# Patient Record
Sex: FEMALE | Race: BLACK OR AFRICAN AMERICAN | URBAN - METROPOLITAN AREA
[De-identification: names, ages, dates, MRNs, and addresses within clinical notes are randomized per-mention and may not be internally consistent; named-entity substitution may affect disease eponyms.]

---

## 2020-01-30 PROBLEM — 428283002 HISTORY OF POLYP OF COLON (SITUATION): Status: ACTIVE | Noted: 2020-01-30

## 2020-01-30 PROBLEM — 70153002 HEMORRHOIDS: Status: ACTIVE | Noted: 2020-01-30

## 2020-01-30 PROBLEM — 266435005 GASTRO-ESOPHAGEAL REFLUX DISEASE WITHOUT ESOPHAGITIS: Status: ACTIVE | Noted: 2020-01-30

## 2020-01-30 PROBLEM — 429006005 FAMILY HISTORY OF MALIGNANT NEOPLASM OF GASTROINTESTINAL TRACT: Status: ACTIVE | Noted: 2020-01-30

## 2020-06-03 ENCOUNTER — OFFICE VISIT (OUTPATIENT)
Dept: URBAN - METROPOLITAN AREA CLINIC 29 | Facility: CLINIC | Age: 60
End: 2020-06-03

## 2020-06-17 ENCOUNTER — OFFICE VISIT (OUTPATIENT)
Dept: URBAN - METROPOLITAN AREA CLINIC 29 | Facility: CLINIC | Age: 60
End: 2020-06-17

## 2020-07-22 ENCOUNTER — OFFICE VISIT (OUTPATIENT)
Dept: URBAN - METROPOLITAN AREA CLINIC 29 | Facility: CLINIC | Age: 60
End: 2020-07-22

## 2020-08-12 ENCOUNTER — OFFICE VISIT (OUTPATIENT)
Dept: URBAN - METROPOLITAN AREA CLINIC 29 | Facility: CLINIC | Age: 60
End: 2020-08-12

## 2020-08-24 ENCOUNTER — OFFICE VISIT (OUTPATIENT)
Dept: URBAN - METROPOLITAN AREA CLINIC 29 | Facility: CLINIC | Age: 60
End: 2020-08-24

## 2020-09-21 ENCOUNTER — OFFICE VISIT (OUTPATIENT)
Dept: URBAN - METROPOLITAN AREA CLINIC 29 | Facility: CLINIC | Age: 60
End: 2020-09-21

## 2020-10-28 ENCOUNTER — OFFICE VISIT (OUTPATIENT)
Dept: URBAN - METROPOLITAN AREA CLINIC 29 | Facility: CLINIC | Age: 60
End: 2020-10-28

## 2020-12-04 ENCOUNTER — OFFICE VISIT (OUTPATIENT)
Dept: URBAN - METROPOLITAN AREA CLINIC 29 | Facility: CLINIC | Age: 60
End: 2020-12-04

## 2021-04-19 ENCOUNTER — OFFICE VISIT (OUTPATIENT)
Dept: URBAN - METROPOLITAN AREA CLINIC 29 | Facility: CLINIC | Age: 61
End: 2021-04-19

## 2021-04-19 PROBLEM — 414916001 OBESITY: Status: ACTIVE | Noted: 2021-04-19

## 2022-02-14 ENCOUNTER — OFFICE VISIT (OUTPATIENT)
Dept: URBAN - METROPOLITAN AREA CLINIC 29 | Facility: CLINIC | Age: 62
End: 2022-02-14

## 2022-02-14 PROBLEM — 271834000 ERUCTATION: Status: ACTIVE | Noted: 2022-02-14

## 2022-02-14 PROBLEM — 422587007 NAUSEA: Status: ACTIVE | Noted: 2022-02-14

## 2022-02-14 PROBLEM — 3696007 FUNCTIONAL DYSPEPSIA: Status: ACTIVE | Noted: 2022-02-14

## 2022-03-08 ENCOUNTER — OFFICE VISIT (OUTPATIENT)
Dept: URBAN - METROPOLITAN AREA SURGERY CENTER 7 | Facility: SURGERY CENTER | Age: 62
End: 2022-03-08

## 2022-04-30 ENCOUNTER — TELEPHONE ENCOUNTER (OUTPATIENT)
Dept: URBAN - METROPOLITAN AREA CLINIC 121 | Facility: CLINIC | Age: 62
End: 2022-04-30

## 2022-04-30 RX ORDER — OMEPRAZOLE MAGNESIUM, AMOXICILLIN AND RIFABUTIN 10; 250; 12.5 MG/1; MG/1; MG/1
TAKE 4 CAPSULE BY MOUTH EVERY EIGHT HOURS WITH MEALS CAPSULE, DELAYED RELEASE ORAL
OUTPATIENT
Start: 2022-02-18 | End: 2022-03-04

## 2022-04-30 RX ORDER — SEMAGLUTIDE 1.34 MG/ML
TAKE 0.25 SC QWEEK X 4 WEEKS INJECTION, SOLUTION SUBCUTANEOUS
OUTPATIENT
Start: 2020-06-03 | End: 2020-07-01

## 2022-05-01 ENCOUNTER — TELEPHONE ENCOUNTER (OUTPATIENT)
Dept: URBAN - METROPOLITAN AREA CLINIC 121 | Facility: CLINIC | Age: 62
End: 2022-05-01

## 2022-05-01 RX ORDER — GABAPENTIN 400 MG/1
QD CAPSULE ORAL
Status: ACTIVE | COMMUNITY
Start: 2020-01-30

## 2022-05-01 RX ORDER — OMEPRAZOLE 40 MG/1
QD CAPSULE, DELAYED RELEASE ORAL
Status: ACTIVE | COMMUNITY
Start: 2020-01-30

## 2022-05-01 RX ORDER — LISINOPRIL AND HYDROCHLOROTHIAZIDE TABLETS 10; 12.5 MG/1; MG/1
QD TABLET ORAL
Status: ACTIVE | COMMUNITY
Start: 2020-01-30

## 2022-05-01 RX ORDER — SEMAGLUTIDE 1.34 MG/ML
INJECT 0.5MG UNDER THE SKIN SC QWEEKS INJECTION, SOLUTION SUBCUTANEOUS
Status: ACTIVE | COMMUNITY
Start: 2020-08-24

## 2022-05-01 RX ORDER — SODIUM SULFATE, POTASSIUM SULFATE, MAGNESIUM SULFATE 17.5; 3.13; 1.6 G/ML; G/ML; G/ML
MIX AND USE AS DIRECTED SOLUTION, CONCENTRATE ORAL
Status: ACTIVE | COMMUNITY
Start: 2020-01-30

## 2022-05-01 RX ORDER — LINACLOTIDE 145 UG/1
TAKE 1 CAPSULE PO QD CAPSULE, GELATIN COATED ORAL
Status: ACTIVE | COMMUNITY
Start: 2020-06-03

## 2022-05-01 RX ORDER — DULOXETINE 60 MG/1
QD CAPSULE, DELAYED RELEASE PELLETS ORAL
Status: ACTIVE | COMMUNITY
Start: 2020-01-30

## 2022-05-01 RX ORDER — CYCLOBENZAPRINE HYDROCHLORIDE 10 MG/1
QD TABLET, FILM COATED ORAL
Status: ACTIVE | COMMUNITY
Start: 2020-01-30

## 2022-05-01 RX ORDER — SEMAGLUTIDE 1.34 MG/ML
INJECT 0.25MG UNDER THE SKIN ONCE A WEEK INJECTION, SOLUTION SUBCUTANEOUS
Status: ACTIVE | COMMUNITY
Start: 2020-06-03

## 2022-05-01 RX ORDER — ESTRADIOL 1 MG/1
QD TABLET ORAL
Status: ACTIVE | COMMUNITY
Start: 2020-01-30

## 2022-05-01 RX ORDER — HYDROCODONE BITARTRATE AND ACETAMINOPHEN 10; 300 MG/15ML; MG/15ML
SYRUP ORAL
Status: ACTIVE | COMMUNITY
Start: 2020-01-30

## 2022-05-01 RX ORDER — LEVOTHYROXINE SODIUM 50 UG/1
QD TABLET ORAL
Status: ACTIVE | COMMUNITY
Start: 2020-01-30

## 2022-05-24 ENCOUNTER — TELEPHONE ENCOUNTER (OUTPATIENT)
Dept: URBAN - METROPOLITAN AREA CLINIC 29 | Facility: CLINIC | Age: 62
End: 2022-05-24

## 2022-06-15 ENCOUNTER — TELEPHONE ENCOUNTER (OUTPATIENT)
Dept: URBAN - METROPOLITAN AREA CLINIC 27 | Facility: CLINIC | Age: 62
End: 2022-06-15

## 2022-06-15 RX ORDER — SEMAGLUTIDE 1.34 MG/ML
INJECT 0.5MG INJECTION, SOLUTION SUBCUTANEOUS
Qty: 1 | Refills: 4
Start: 2020-08-24 | End: 2022-11-11

## 2022-07-18 ENCOUNTER — OFFICE VISIT (OUTPATIENT)
Dept: URBAN - METROPOLITAN AREA CLINIC 29 | Facility: CLINIC | Age: 62
End: 2022-07-18
Payer: COMMERCIAL

## 2022-07-18 VITALS
TEMPERATURE: 97.1 F | BODY MASS INDEX: 31.71 KG/M2 | SYSTOLIC BLOOD PRESSURE: 119 MMHG | HEART RATE: 82 BPM | HEIGHT: 63 IN | DIASTOLIC BLOOD PRESSURE: 78 MMHG | WEIGHT: 179 LBS

## 2022-07-18 DIAGNOSIS — K59.04 CHRONIC IDIOPATHIC CONSTIPATION: ICD-10-CM

## 2022-07-18 PROCEDURE — 99213 OFFICE O/P EST LOW 20 MIN: CPT | Performed by: INTERNAL MEDICINE

## 2022-07-18 PROCEDURE — 99204 OFFICE O/P NEW MOD 45 MIN: CPT | Performed by: INTERNAL MEDICINE

## 2022-07-18 RX ORDER — LISINOPRIL AND HYDROCHLOROTHIAZIDE TABLETS 10; 12.5 MG/1; MG/1
QD TABLET ORAL
Status: ACTIVE | COMMUNITY
Start: 2020-01-30

## 2022-07-18 RX ORDER — HYDROCODONE BITARTRATE AND ACETAMINOPHEN 10; 300 MG/15ML; MG/15ML
SYRUP ORAL
Status: ACTIVE | COMMUNITY
Start: 2020-01-30

## 2022-07-18 RX ORDER — OMEPRAZOLE 40 MG/1
QD CAPSULE, DELAYED RELEASE ORAL
Status: ACTIVE | COMMUNITY
Start: 2020-01-30

## 2022-07-18 RX ORDER — TENAPANOR HYDROCHLORIDE 53.2 MG/1
1 TABLET IMMEDIATELY BEFORE MEALS TABLET ORAL TWICE A DAY
Qty: 60 | OUTPATIENT
Start: 2022-07-18 | End: 2022-08-17

## 2022-07-18 RX ORDER — SODIUM SULFATE, POTASSIUM SULFATE, MAGNESIUM SULFATE 17.5; 3.13; 1.6 G/ML; G/ML; G/ML
MIX AND USE AS DIRECTED SOLUTION, CONCENTRATE ORAL
Status: ACTIVE | COMMUNITY
Start: 2020-01-30

## 2022-07-18 RX ORDER — LINACLOTIDE 145 UG/1
TAKE 1 CAPSULE PO QD CAPSULE, GELATIN COATED ORAL
Status: ACTIVE | COMMUNITY
Start: 2020-06-03

## 2022-07-18 RX ORDER — LEVOTHYROXINE SODIUM 50 UG/1
QD TABLET ORAL
Status: ACTIVE | COMMUNITY
Start: 2020-01-30

## 2022-07-18 RX ORDER — SEMAGLUTIDE 1.34 MG/ML
INJECT 0.5MG INJECTION, SOLUTION SUBCUTANEOUS
Qty: 1 | Refills: 4 | Status: ACTIVE | COMMUNITY
Start: 2020-08-24 | End: 2022-11-11

## 2022-07-18 RX ORDER — GABAPENTIN 400 MG/1
QD CAPSULE ORAL
Status: ACTIVE | COMMUNITY
Start: 2020-01-30

## 2022-07-18 RX ORDER — ESTRADIOL 1 MG/1
QD TABLET ORAL
Status: ACTIVE | COMMUNITY
Start: 2020-01-30

## 2022-07-18 RX ORDER — CYCLOBENZAPRINE HYDROCHLORIDE 10 MG/1
QD TABLET, FILM COATED ORAL
Status: ACTIVE | COMMUNITY
Start: 2020-01-30

## 2022-07-18 RX ORDER — DULOXETINE 60 MG/1
QD CAPSULE, DELAYED RELEASE PELLETS ORAL
Status: ACTIVE | COMMUNITY
Start: 2020-01-30

## 2022-07-18 NOTE — HPI-TODAY'S VISIT:
Ms. Govea is  62 year  old woman complaining of epigastric burning.   She  is on omeprazole daily.   She had an EMR performed for a tubular adenoma in her duodenum in 5/22.   She states the heart burn is better.  She also suffers from constipation.  She has a bowel movement every other day with straining.

## 2022-08-19 ENCOUNTER — OFFICE VISIT (OUTPATIENT)
Dept: URBAN - METROPOLITAN AREA CLINIC 29 | Facility: CLINIC | Age: 62
End: 2022-08-19

## 2022-10-31 ENCOUNTER — TELEPHONE ENCOUNTER (OUTPATIENT)
Dept: URBAN - METROPOLITAN AREA CLINIC 29 | Facility: CLINIC | Age: 62
End: 2022-10-31

## 2022-10-31 RX ORDER — TENAPANOR HYDROCHLORIDE 53.2 MG/1
1 TABLET IMMEDIATELY BEFORE MEALS TABLET ORAL TWICE A DAY
Qty: 60 | Refills: 1
Start: 2022-07-18 | End: 2022-12-30

## 2022-11-09 ENCOUNTER — OFFICE VISIT (OUTPATIENT)
Dept: URBAN - METROPOLITAN AREA CLINIC 29 | Facility: CLINIC | Age: 62
End: 2022-11-09
Payer: COMMERCIAL

## 2022-11-09 VITALS
DIASTOLIC BLOOD PRESSURE: 80 MMHG | BODY MASS INDEX: 31.18 KG/M2 | TEMPERATURE: 96.9 F | HEART RATE: 89 BPM | WEIGHT: 176 LBS | SYSTOLIC BLOOD PRESSURE: 131 MMHG | HEIGHT: 63 IN

## 2022-11-09 DIAGNOSIS — D13.2 DUODENAL ADENOMA: ICD-10-CM

## 2022-11-09 DIAGNOSIS — K59.04 CHRONIC IDIOPATHIC CONSTIPATION: ICD-10-CM

## 2022-11-09 PROCEDURE — 99213 OFFICE O/P EST LOW 20 MIN: CPT | Performed by: INTERNAL MEDICINE

## 2022-11-09 RX ORDER — OMEPRAZOLE 40 MG/1
QD CAPSULE, DELAYED RELEASE ORAL
Status: ACTIVE | COMMUNITY
Start: 2020-01-30

## 2022-11-09 RX ORDER — LINACLOTIDE 145 UG/1
TAKE 1 CAPSULE PO QD CAPSULE, GELATIN COATED ORAL
Status: ACTIVE | COMMUNITY
Start: 2020-06-03

## 2022-11-09 RX ORDER — SODIUM SULFATE, POTASSIUM SULFATE, MAGNESIUM SULFATE 17.5; 3.13; 1.6 G/ML; G/ML; G/ML
MIX AND USE AS DIRECTED SOLUTION, CONCENTRATE ORAL
Status: ACTIVE | COMMUNITY
Start: 2020-01-30

## 2022-11-09 RX ORDER — LEVOTHYROXINE SODIUM 50 UG/1
QD TABLET ORAL
Status: ACTIVE | COMMUNITY
Start: 2020-01-30

## 2022-11-09 RX ORDER — GABAPENTIN 400 MG/1
QD CAPSULE ORAL
Status: ACTIVE | COMMUNITY
Start: 2020-01-30

## 2022-11-09 RX ORDER — TENAPANOR HYDROCHLORIDE 53.2 MG/1
1 TABLET IMMEDIATELY BEFORE MEALS TABLET ORAL TWICE A DAY
Qty: 60 | Refills: 1
Start: 2022-07-18 | End: 2023-01-08

## 2022-11-09 RX ORDER — ESTRADIOL 1 MG/1
QD TABLET ORAL
Status: ACTIVE | COMMUNITY
Start: 2020-01-30

## 2022-11-09 RX ORDER — DULOXETINE 60 MG/1
QD CAPSULE, DELAYED RELEASE PELLETS ORAL
Status: ACTIVE | COMMUNITY
Start: 2020-01-30

## 2022-11-09 RX ORDER — LISINOPRIL AND HYDROCHLOROTHIAZIDE TABLETS 10; 12.5 MG/1; MG/1
QD TABLET ORAL
Status: ACTIVE | COMMUNITY
Start: 2020-01-30

## 2022-11-09 RX ORDER — HYDROCODONE BITARTRATE AND ACETAMINOPHEN 10; 300 MG/15ML; MG/15ML
SYRUP ORAL
Status: ACTIVE | COMMUNITY
Start: 2020-01-30

## 2022-11-09 RX ORDER — SEMAGLUTIDE 1.34 MG/ML
INJECT 0.5MG INJECTION, SOLUTION SUBCUTANEOUS
Qty: 1 | Refills: 4 | Status: ACTIVE | COMMUNITY
Start: 2020-08-24 | End: 2022-11-11

## 2022-11-09 RX ORDER — TENAPANOR HYDROCHLORIDE 53.2 MG/1
1 TABLET IMMEDIATELY BEFORE MEALS TABLET ORAL TWICE A DAY
Qty: 60 | Refills: 1 | Status: ACTIVE | COMMUNITY
Start: 2022-07-18 | End: 2022-12-30

## 2022-11-09 RX ORDER — CYCLOBENZAPRINE HYDROCHLORIDE 10 MG/1
QD TABLET, FILM COATED ORAL
Status: ACTIVE | COMMUNITY
Start: 2020-01-30

## 2022-11-09 NOTE — HPI-TODAY'S VISIT:
Ms. Govea is a 62-year old who present for follow p.  She had  tubular adenoma resected from her duodenum 5/2022 and was told she needs  a follow up EGD.  SHe has no  foregut symptoms.  Currently.  She states the Ibsrela is working, but she can only take  half  a dose, and she has a watery stool in the AM.

## 2022-11-14 ENCOUNTER — CLAIMS CREATED FROM THE CLAIM WINDOW (OUTPATIENT)
Dept: URBAN - METROPOLITAN AREA SURGERY CENTER 7 | Facility: SURGERY CENTER | Age: 62
End: 2022-11-14
Payer: COMMERCIAL

## 2022-11-14 ENCOUNTER — CLAIMS CREATED FROM THE CLAIM WINDOW (OUTPATIENT)
Dept: URBAN - METROPOLITAN AREA SURGERY CENTER 7 | Facility: SURGERY CENTER | Age: 62
End: 2022-11-14

## 2022-11-14 ENCOUNTER — CLAIMS CREATED FROM THE CLAIM WINDOW (OUTPATIENT)
Dept: URBAN - METROPOLITAN AREA CLINIC 4 | Facility: CLINIC | Age: 62
End: 2022-11-14
Payer: COMMERCIAL

## 2022-11-14 DIAGNOSIS — K31.7 POLYP OF STOMACH AND DUODENUM: ICD-10-CM

## 2022-11-14 DIAGNOSIS — K31.89 ACQUIRED DEFORMITY OF DUODENUM: ICD-10-CM

## 2022-11-14 DIAGNOSIS — D13.2 ADENOMA OF DUODENUM: ICD-10-CM

## 2022-11-14 DIAGNOSIS — K31.7 BENIGN GASTRIC POLYP: ICD-10-CM

## 2022-11-14 DIAGNOSIS — K29.70 GASTRITIS, UNSPECIFIED, WITHOUT BLEEDING: ICD-10-CM

## 2022-11-14 DIAGNOSIS — K31.89 OTHER DISEASES OF STOMACH AND DUODENUM: ICD-10-CM

## 2022-11-14 PROCEDURE — 88305 TISSUE EXAM BY PATHOLOGIST: CPT | Performed by: PATHOLOGY

## 2022-11-14 PROCEDURE — G8907 PT DOC NO EVENTS ON DISCHARG: HCPCS | Performed by: INTERNAL MEDICINE

## 2022-11-14 PROCEDURE — 88312 SPECIAL STAINS GROUP 1: CPT | Performed by: PATHOLOGY

## 2022-11-14 PROCEDURE — G8907 PT DOC NO EVENTS ON DISCHARG: HCPCS | Performed by: CLINIC/CENTER

## 2022-11-14 PROCEDURE — 43239 EGD BIOPSY SINGLE/MULTIPLE: CPT | Performed by: INTERNAL MEDICINE

## 2022-11-14 PROCEDURE — 43239 EGD BIOPSY SINGLE/MULTIPLE: CPT | Performed by: CLINIC/CENTER

## 2022-11-14 RX ORDER — SODIUM SULFATE, POTASSIUM SULFATE, MAGNESIUM SULFATE 17.5; 3.13; 1.6 G/ML; G/ML; G/ML
MIX AND USE AS DIRECTED SOLUTION, CONCENTRATE ORAL
Status: ACTIVE | COMMUNITY
Start: 2020-01-30

## 2022-11-14 RX ORDER — DULOXETINE 60 MG/1
QD CAPSULE, DELAYED RELEASE PELLETS ORAL
Status: ACTIVE | COMMUNITY
Start: 2020-01-30

## 2022-11-14 RX ORDER — LINACLOTIDE 145 UG/1
TAKE 1 CAPSULE PO QD CAPSULE, GELATIN COATED ORAL
Status: ACTIVE | COMMUNITY
Start: 2020-06-03

## 2022-11-14 RX ORDER — HYDROCODONE BITARTRATE AND ACETAMINOPHEN 10; 300 MG/15ML; MG/15ML
SYRUP ORAL
Status: ACTIVE | COMMUNITY
Start: 2020-01-30

## 2022-11-14 RX ORDER — GABAPENTIN 400 MG/1
QD CAPSULE ORAL
Status: ACTIVE | COMMUNITY
Start: 2020-01-30

## 2022-11-14 RX ORDER — CYCLOBENZAPRINE HYDROCHLORIDE 10 MG/1
QD TABLET, FILM COATED ORAL
Status: ACTIVE | COMMUNITY
Start: 2020-01-30

## 2022-11-14 RX ORDER — OMEPRAZOLE 40 MG/1
QD CAPSULE, DELAYED RELEASE ORAL
Status: ACTIVE | COMMUNITY
Start: 2020-01-30

## 2022-11-14 RX ORDER — TENAPANOR HYDROCHLORIDE 53.2 MG/1
1 TABLET IMMEDIATELY BEFORE MEALS TABLET ORAL TWICE A DAY
Qty: 60 | Refills: 1 | Status: ACTIVE | COMMUNITY
Start: 2022-07-18 | End: 2023-01-08

## 2022-11-14 RX ORDER — LISINOPRIL AND HYDROCHLOROTHIAZIDE TABLETS 10; 12.5 MG/1; MG/1
QD TABLET ORAL
Status: ACTIVE | COMMUNITY
Start: 2020-01-30

## 2022-11-14 RX ORDER — ESTRADIOL 1 MG/1
QD TABLET ORAL
Status: ACTIVE | COMMUNITY
Start: 2020-01-30

## 2022-11-14 RX ORDER — LEVOTHYROXINE SODIUM 50 UG/1
QD TABLET ORAL
Status: ACTIVE | COMMUNITY
Start: 2020-01-30

## 2022-12-21 ENCOUNTER — TELEPHONE ENCOUNTER (OUTPATIENT)
Dept: URBAN - METROPOLITAN AREA CLINIC 29 | Facility: CLINIC | Age: 62
End: 2022-12-21

## 2023-01-04 ENCOUNTER — LAB OUTSIDE AN ENCOUNTER (OUTPATIENT)
Dept: URBAN - METROPOLITAN AREA CLINIC 29 | Facility: CLINIC | Age: 63
End: 2023-01-04

## 2023-01-04 PROBLEM — 447731000: Status: ACTIVE | Noted: 2023-01-04

## 2023-01-11 ENCOUNTER — DASHBOARD ENCOUNTERS (OUTPATIENT)
Age: 63
End: 2023-01-11

## 2023-01-11 ENCOUNTER — OFFICE VISIT (OUTPATIENT)
Dept: URBAN - METROPOLITAN AREA CLINIC 29 | Facility: CLINIC | Age: 63
End: 2023-01-11
Payer: COMMERCIAL

## 2023-01-11 VITALS
HEIGHT: 63 IN | BODY MASS INDEX: 32.6 KG/M2 | DIASTOLIC BLOOD PRESSURE: 83 MMHG | HEART RATE: 95 BPM | WEIGHT: 184 LBS | SYSTOLIC BLOOD PRESSURE: 125 MMHG

## 2023-01-11 DIAGNOSIS — K59.04 CHRONIC IDIOPATHIC CONSTIPATION: ICD-10-CM

## 2023-01-11 DIAGNOSIS — R10.13 DYSPEPSIA: ICD-10-CM

## 2023-01-11 PROCEDURE — 99213 OFFICE O/P EST LOW 20 MIN: CPT | Performed by: INTERNAL MEDICINE

## 2023-01-11 RX ORDER — OMEPRAZOLE 40 MG/1
QD CAPSULE, DELAYED RELEASE ORAL
Status: ACTIVE | COMMUNITY
Start: 2020-01-30

## 2023-01-11 RX ORDER — DULOXETINE 60 MG/1
QD CAPSULE, DELAYED RELEASE PELLETS ORAL
Status: ACTIVE | COMMUNITY
Start: 2020-01-30

## 2023-01-11 RX ORDER — ESTRADIOL 1 MG/1
QD TABLET ORAL
Status: ACTIVE | COMMUNITY
Start: 2020-01-30

## 2023-01-11 RX ORDER — SODIUM SULFATE, POTASSIUM SULFATE, MAGNESIUM SULFATE 17.5; 3.13; 1.6 G/ML; G/ML; G/ML
MIX AND USE AS DIRECTED SOLUTION, CONCENTRATE ORAL
Status: ACTIVE | COMMUNITY
Start: 2020-01-30

## 2023-01-11 RX ORDER — DESIPRAMINE HYDROCHLORIDE 25 MG/1
1 TABLET TABLET, FILM COATED ORAL
Qty: 30 | Refills: 6 | OUTPATIENT

## 2023-01-11 RX ORDER — CYCLOBENZAPRINE HYDROCHLORIDE 10 MG/1
QD TABLET, FILM COATED ORAL
Status: ACTIVE | COMMUNITY
Start: 2020-01-30

## 2023-01-11 RX ORDER — LISINOPRIL AND HYDROCHLOROTHIAZIDE TABLETS 10; 12.5 MG/1; MG/1
QD TABLET ORAL
Status: ACTIVE | COMMUNITY
Start: 2020-01-30

## 2023-01-11 RX ORDER — GABAPENTIN 400 MG/1
QD CAPSULE ORAL
Status: ACTIVE | COMMUNITY
Start: 2020-01-30

## 2023-01-11 RX ORDER — LINACLOTIDE 145 UG/1
TAKE 1 CAPSULE PO QD CAPSULE, GELATIN COATED ORAL
Status: ACTIVE | COMMUNITY
Start: 2020-06-03

## 2023-01-11 RX ORDER — LEVOTHYROXINE SODIUM 50 UG/1
QD TABLET ORAL
Status: ACTIVE | COMMUNITY
Start: 2020-01-30

## 2023-01-11 RX ORDER — HYDROCODONE BITARTRATE AND ACETAMINOPHEN 10; 300 MG/15ML; MG/15ML
SYRUP ORAL
Status: ACTIVE | COMMUNITY
Start: 2020-01-30

## 2023-01-11 RX ORDER — LUBIPROSTONE 24 UG/1
1 CAPSULE WITH FOOD AND WATER CAPSULE, GELATIN COATED ORAL TWICE A DAY
Qty: 60 | OUTPATIENT
Start: 2023-01-11 | End: 2023-02-10

## 2023-01-11 NOTE — HPI-TODAY'S VISIT:
Ms. Govea is a 62-year-old woman who is here for follow up.  She states that she  is having epigastric pain and burning despite being on omeprazole.  She is concerned about her repeat EGD/EUS.  She is also had prolapse of her hemorrhoid.  She is currently taking 1/2 a tablet of IBSrella and she has 1-2 watery stools a day.

## 2023-01-19 ENCOUNTER — TELEPHONE ENCOUNTER (OUTPATIENT)
Dept: URBAN - METROPOLITAN AREA CLINIC 29 | Facility: CLINIC | Age: 63
End: 2023-01-19

## 2023-01-19 PROBLEM — 82934008 CHRONIC IDIOPATHIC CONSTIPATION: Status: ACTIVE | Noted: 2022-07-18

## 2023-02-10 ENCOUNTER — OFFICE VISIT (OUTPATIENT)
Dept: URBAN - METROPOLITAN AREA CLINIC 29 | Facility: CLINIC | Age: 63
End: 2023-02-10

## 2023-02-15 ENCOUNTER — OFFICE VISIT (OUTPATIENT)
Dept: URBAN - METROPOLITAN AREA CLINIC 29 | Facility: CLINIC | Age: 63
End: 2023-02-15

## 2023-03-28 ENCOUNTER — ERX REFILL RESPONSE (OUTPATIENT)
Dept: URBAN - METROPOLITAN AREA CLINIC 27 | Facility: CLINIC | Age: 63
End: 2023-03-28

## 2023-03-28 RX ORDER — SEMAGLUTIDE 1.34 MG/ML
INJECT ONE PREFILLED PEN UNDER THE SKIN ONCE A WEEK ON THE SAME DAY EACH WEEK INJECTION, SOLUTION SUBCUTANEOUS
Qty: 1.5 MILLILITER | Refills: 0 | OUTPATIENT

## 2023-08-15 ENCOUNTER — ERX REFILL RESPONSE (OUTPATIENT)
Dept: URBAN - METROPOLITAN AREA CLINIC 29 | Facility: CLINIC | Age: 63
End: 2023-08-15

## 2023-08-15 RX ORDER — DESIPRAMINE 25 MG/1
TAKE 1 TABLET BY MOUTH EVERY NIGHT FOR 30 DAYS TABLET, FILM COATED ORAL
Qty: 30 TABLET | Refills: 11 | OUTPATIENT

## 2023-08-15 RX ORDER — DESIPRAMINE HYDROCHLORIDE 25 MG/1
1 TABLET TABLET, FILM COATED ORAL
Qty: 30 | Refills: 6 | OUTPATIENT

## 2023-09-08 ENCOUNTER — TELEPHONE ENCOUNTER (OUTPATIENT)
Dept: URBAN - METROPOLITAN AREA CLINIC 27 | Facility: CLINIC | Age: 63
End: 2023-09-08

## 2023-09-08 RX ORDER — DESIPRAMINE 25 MG/1
TAKE 1 TABLET BY MOUTH EVERY NIGHT FOR 30 DAYS TABLET, FILM COATED ORAL
Qty: 30 TABLET | Refills: 11

## 2023-11-06 ENCOUNTER — OFFICE VISIT (OUTPATIENT)
Dept: URBAN - METROPOLITAN AREA CLINIC 29 | Facility: CLINIC | Age: 63
End: 2023-11-06

## 2023-11-14 ENCOUNTER — OFFICE VISIT (OUTPATIENT)
Dept: URBAN - METROPOLITAN AREA CLINIC 29 | Facility: CLINIC | Age: 63
End: 2023-11-14

## 2023-11-28 ENCOUNTER — OFFICE VISIT (OUTPATIENT)
Dept: URBAN - METROPOLITAN AREA CLINIC 29 | Facility: CLINIC | Age: 63
End: 2023-11-28

## 2024-08-06 ENCOUNTER — OFFICE VISIT (OUTPATIENT)
Dept: URBAN - METROPOLITAN AREA CLINIC 29 | Facility: CLINIC | Age: 64
End: 2024-08-06
Payer: COMMERCIAL

## 2024-08-06 VITALS
HEART RATE: 84 BPM | WEIGHT: 178 LBS | SYSTOLIC BLOOD PRESSURE: 120 MMHG | DIASTOLIC BLOOD PRESSURE: 78 MMHG | BODY MASS INDEX: 31.54 KG/M2 | HEIGHT: 63 IN

## 2024-08-06 DIAGNOSIS — R15.1 FECAL SMEARING: ICD-10-CM

## 2024-08-06 DIAGNOSIS — K59.04 CHRONIC IDIOPATHIC CONSTIPATION: ICD-10-CM

## 2024-08-06 DIAGNOSIS — Z12.11 SCREEN FOR COLON CANCER: ICD-10-CM

## 2024-08-06 PROCEDURE — 99214 OFFICE O/P EST MOD 30 MIN: CPT | Performed by: INTERNAL MEDICINE

## 2024-08-06 PROCEDURE — 99204 OFFICE O/P NEW MOD 45 MIN: CPT | Performed by: INTERNAL MEDICINE

## 2024-08-06 RX ORDER — ESTRADIOL 1 MG/1
QD TABLET ORAL
Status: ACTIVE | COMMUNITY
Start: 2020-01-30

## 2024-08-06 RX ORDER — SEMAGLUTIDE 1.34 MG/ML
INJECT ONE PREFILLED PEN UNDER THE SKIN ONCE A WEEK ON THE SAME DAY EACH WEEK INJECTION, SOLUTION SUBCUTANEOUS
Qty: 1.5 MILLILITER | Refills: 0 | Status: ACTIVE | COMMUNITY

## 2024-08-06 RX ORDER — GABAPENTIN 400 MG/1
QD CAPSULE ORAL
Status: ACTIVE | COMMUNITY
Start: 2020-01-30

## 2024-08-06 RX ORDER — LINACLOTIDE 145 UG/1
TAKE 1 CAPSULE PO QD CAPSULE, GELATIN COATED ORAL
Status: ACTIVE | COMMUNITY
Start: 2020-06-03

## 2024-08-06 RX ORDER — CYCLOBENZAPRINE HYDROCHLORIDE 10 MG/1
QD TABLET, FILM COATED ORAL
Status: ACTIVE | COMMUNITY
Start: 2020-01-30

## 2024-08-06 RX ORDER — LEVOTHYROXINE SODIUM 50 UG/1
QD TABLET ORAL
Status: ACTIVE | COMMUNITY
Start: 2020-01-30

## 2024-08-06 RX ORDER — LISINOPRIL AND HYDROCHLOROTHIAZIDE TABLETS 10; 12.5 MG/1; MG/1
QD TABLET ORAL
Status: ACTIVE | COMMUNITY
Start: 2020-01-30

## 2024-08-06 RX ORDER — OMEPRAZOLE 40 MG/1
QD CAPSULE, DELAYED RELEASE ORAL
Status: ACTIVE | COMMUNITY
Start: 2020-01-30

## 2024-08-06 RX ORDER — SODIUM SULFATE, POTASSIUM SULFATE, MAGNESIUM SULFATE 17.5; 3.13; 1.6 G/ML; G/ML; G/ML
MIX AND USE AS DIRECTED SOLUTION, CONCENTRATE ORAL
Status: ACTIVE | COMMUNITY
Start: 2020-01-30

## 2024-08-06 RX ORDER — SODIUM, POTASSIUM,MAG SULFATES 17.5-3.13G
177ML SOLUTION, RECONSTITUTED, ORAL ORAL
Qty: 1 KIT | Refills: 0 | OUTPATIENT
Start: 2024-08-06 | End: 2024-08-07

## 2024-08-06 RX ORDER — HYDROCODONE BITARTRATE AND ACETAMINOPHEN 10; 300 MG/15ML; MG/15ML
SYRUP ORAL
Status: ACTIVE | COMMUNITY
Start: 2020-01-30

## 2024-08-06 RX ORDER — ONDANSETRON HYDROCHLORIDE 4 MG/1
1 TABLET TABLET, FILM COATED ORAL
Qty: 2 | Refills: 0 | OUTPATIENT
Start: 2024-08-06

## 2024-08-06 RX ORDER — PLECANATIDE 3 MG/1
1 TABLET TABLET ORAL ONCE A DAY
Qty: 30 | Refills: 3 | OUTPATIENT
Start: 2024-08-06 | End: 2024-12-03

## 2024-08-06 RX ORDER — DESIPRAMINE 25 MG/1
TAKE 1 TABLET BY MOUTH EVERY NIGHT FOR 30 DAYS TABLET, FILM COATED ORAL
Qty: 30 TABLET | Refills: 11 | Status: ACTIVE | COMMUNITY

## 2024-08-06 RX ORDER — DULOXETINE 60 MG/1
QD CAPSULE, DELAYED RELEASE PELLETS ORAL
Status: ACTIVE | COMMUNITY
Start: 2020-01-30

## 2024-08-06 NOTE — HPI-TODAY'S VISIT:
Ms. Govea is a 64-year-old woman who is here for evaluation of hemorrhoids.   She states that she has stool leakage.  She takes Ibsrela, she has had banding in the past, and she said it did not work.

## 2024-08-13 ENCOUNTER — LAB OUTSIDE AN ENCOUNTER (OUTPATIENT)
Dept: URBAN - METROPOLITAN AREA CLINIC 29 | Facility: CLINIC | Age: 64
End: 2024-08-13

## 2024-09-10 ENCOUNTER — CLAIMS CREATED FROM THE CLAIM WINDOW (OUTPATIENT)
Dept: URBAN - METROPOLITAN AREA CLINIC 4 | Facility: CLINIC | Age: 64
End: 2024-09-10
Payer: COMMERCIAL

## 2024-09-10 ENCOUNTER — CLAIMS CREATED FROM THE CLAIM WINDOW (OUTPATIENT)
Dept: URBAN - METROPOLITAN AREA SURGERY CENTER 7 | Facility: SURGERY CENTER | Age: 64
End: 2024-09-10
Payer: COMMERCIAL

## 2024-09-10 DIAGNOSIS — Z12.11 COLON CANCER SCREENING (HIGH RISK): ICD-10-CM

## 2024-09-10 DIAGNOSIS — Z12.11 COLON CANCER SCREENING: ICD-10-CM

## 2024-09-10 DIAGNOSIS — D12.3 BENIGN NEOPLASM OF TRANSVERSE COLON: ICD-10-CM

## 2024-09-10 DIAGNOSIS — K64.8 HEMORRHOIDS, INTERNAL: ICD-10-CM

## 2024-09-10 DIAGNOSIS — D12.3 ADENOMA OF TRANSVERSE COLON: ICD-10-CM

## 2024-09-10 PROCEDURE — 45385 COLONOSCOPY W/LESION REMOVAL: CPT | Performed by: INTERNAL MEDICINE

## 2024-09-10 PROCEDURE — 88305 TISSUE EXAM BY PATHOLOGIST: CPT | Performed by: PATHOLOGY

## 2024-09-10 PROCEDURE — 00812 ANES LWR INTST SCR COLSC: CPT | Performed by: NURSE ANESTHETIST, CERTIFIED REGISTERED

## 2024-09-10 RX ORDER — LEVOTHYROXINE SODIUM 50 UG/1
QD TABLET ORAL
Status: ACTIVE | COMMUNITY
Start: 2020-01-30

## 2024-09-10 RX ORDER — HYDROCODONE BITARTRATE AND ACETAMINOPHEN 10; 300 MG/15ML; MG/15ML
SYRUP ORAL
Status: ACTIVE | COMMUNITY
Start: 2020-01-30

## 2024-09-10 RX ORDER — SODIUM SULFATE, POTASSIUM SULFATE, MAGNESIUM SULFATE 17.5; 3.13; 1.6 G/ML; G/ML; G/ML
MIX AND USE AS DIRECTED SOLUTION, CONCENTRATE ORAL
Status: ACTIVE | COMMUNITY
Start: 2020-01-30

## 2024-09-10 RX ORDER — DULOXETINE 60 MG/1
QD CAPSULE, DELAYED RELEASE PELLETS ORAL
Status: ACTIVE | COMMUNITY
Start: 2020-01-30

## 2024-09-10 RX ORDER — SEMAGLUTIDE 1.34 MG/ML
INJECT ONE PREFILLED PEN UNDER THE SKIN ONCE A WEEK ON THE SAME DAY EACH WEEK INJECTION, SOLUTION SUBCUTANEOUS
Qty: 1.5 MILLILITER | Refills: 0 | Status: ACTIVE | COMMUNITY

## 2024-09-10 RX ORDER — ESTRADIOL 1 MG/1
QD TABLET ORAL
Status: ACTIVE | COMMUNITY
Start: 2020-01-30

## 2024-09-10 RX ORDER — OMEPRAZOLE 40 MG/1
QD CAPSULE, DELAYED RELEASE ORAL
Status: ACTIVE | COMMUNITY
Start: 2020-01-30

## 2024-09-10 RX ORDER — LISINOPRIL AND HYDROCHLOROTHIAZIDE TABLETS 10; 12.5 MG/1; MG/1
QD TABLET ORAL
Status: ACTIVE | COMMUNITY
Start: 2020-01-30

## 2024-09-10 RX ORDER — CYCLOBENZAPRINE HYDROCHLORIDE 10 MG/1
QD TABLET, FILM COATED ORAL
Status: ACTIVE | COMMUNITY
Start: 2020-01-30

## 2024-09-10 RX ORDER — GABAPENTIN 400 MG/1
QD CAPSULE ORAL
Status: ACTIVE | COMMUNITY
Start: 2020-01-30

## 2024-09-10 RX ORDER — PLECANATIDE 3 MG/1
1 TABLET TABLET ORAL ONCE A DAY
Qty: 30 | Refills: 3 | Status: ACTIVE | COMMUNITY
Start: 2024-08-06 | End: 2024-12-03

## 2024-09-10 RX ORDER — LINACLOTIDE 145 UG/1
TAKE 1 CAPSULE PO QD CAPSULE, GELATIN COATED ORAL
Status: ACTIVE | COMMUNITY
Start: 2020-06-03

## 2024-09-10 RX ORDER — ONDANSETRON HYDROCHLORIDE 4 MG/1
1 TABLET TABLET, FILM COATED ORAL
Qty: 2 | Refills: 0 | Status: ACTIVE | COMMUNITY
Start: 2024-08-06

## 2024-09-10 RX ORDER — DESIPRAMINE 25 MG/1
TAKE 1 TABLET BY MOUTH EVERY NIGHT FOR 30 DAYS TABLET, FILM COATED ORAL
Qty: 30 TABLET | Refills: 11 | Status: ACTIVE | COMMUNITY

## 2024-09-16 ENCOUNTER — TELEPHONE ENCOUNTER (OUTPATIENT)
Dept: URBAN - METROPOLITAN AREA CLINIC 29 | Facility: CLINIC | Age: 64
End: 2024-09-16

## 2024-10-04 ENCOUNTER — OFFICE VISIT (OUTPATIENT)
Dept: URBAN - METROPOLITAN AREA CLINIC 29 | Facility: CLINIC | Age: 64
End: 2024-10-04
Payer: COMMERCIAL

## 2024-10-04 VITALS
WEIGHT: 179 LBS | HEIGHT: 63 IN | BODY MASS INDEX: 31.71 KG/M2 | HEART RATE: 86 BPM | SYSTOLIC BLOOD PRESSURE: 125 MMHG | DIASTOLIC BLOOD PRESSURE: 79 MMHG

## 2024-10-04 DIAGNOSIS — K64.1 GRADE II HEMORRHOIDS: ICD-10-CM

## 2024-10-04 PROCEDURE — 46221 LIGATION OF HEMORRHOID(S): CPT | Performed by: INTERNAL MEDICINE

## 2024-10-04 RX ORDER — PLECANATIDE 3 MG/1
1 TABLET TABLET ORAL ONCE A DAY
Qty: 30 | Refills: 3 | Status: ACTIVE | COMMUNITY
Start: 2024-08-06 | End: 2024-12-03

## 2024-10-04 RX ORDER — ONDANSETRON HYDROCHLORIDE 4 MG/1
1 TABLET TABLET, FILM COATED ORAL
Qty: 2 | Refills: 0 | Status: ACTIVE | COMMUNITY
Start: 2024-08-06

## 2024-10-04 RX ORDER — LINACLOTIDE 145 UG/1
TAKE 1 CAPSULE PO QD CAPSULE, GELATIN COATED ORAL
Status: ACTIVE | COMMUNITY
Start: 2020-06-03

## 2024-10-04 RX ORDER — ESTRADIOL 1 MG/1
QD TABLET ORAL
Status: ACTIVE | COMMUNITY
Start: 2020-01-30

## 2024-10-04 RX ORDER — HYDROCODONE BITARTRATE AND ACETAMINOPHEN 10; 300 MG/15ML; MG/15ML
SYRUP ORAL
Status: ACTIVE | COMMUNITY
Start: 2020-01-30

## 2024-10-04 RX ORDER — LISINOPRIL AND HYDROCHLOROTHIAZIDE TABLETS 10; 12.5 MG/1; MG/1
QD TABLET ORAL
Status: ACTIVE | COMMUNITY
Start: 2020-01-30

## 2024-10-04 RX ORDER — SEMAGLUTIDE 1.34 MG/ML
INJECT ONE PREFILLED PEN UNDER THE SKIN ONCE A WEEK ON THE SAME DAY EACH WEEK INJECTION, SOLUTION SUBCUTANEOUS
Qty: 1.5 MILLILITER | Refills: 0 | Status: ACTIVE | COMMUNITY

## 2024-10-04 RX ORDER — CYCLOBENZAPRINE HYDROCHLORIDE 10 MG/1
QD TABLET, FILM COATED ORAL
Status: ACTIVE | COMMUNITY
Start: 2020-01-30

## 2024-10-04 RX ORDER — SODIUM SULFATE, POTASSIUM SULFATE, MAGNESIUM SULFATE 17.5; 3.13; 1.6 G/ML; G/ML; G/ML
MIX AND USE AS DIRECTED SOLUTION, CONCENTRATE ORAL
Status: ACTIVE | COMMUNITY
Start: 2020-01-30

## 2024-10-04 RX ORDER — OMEPRAZOLE 40 MG/1
QD CAPSULE, DELAYED RELEASE ORAL
Status: ACTIVE | COMMUNITY
Start: 2020-01-30

## 2024-10-04 RX ORDER — LEVOTHYROXINE SODIUM 50 UG/1
QD TABLET ORAL
Status: ACTIVE | COMMUNITY
Start: 2020-01-30

## 2024-10-04 RX ORDER — DULOXETINE 60 MG/1
QD CAPSULE, DELAYED RELEASE PELLETS ORAL
Status: ACTIVE | COMMUNITY
Start: 2020-01-30

## 2024-10-04 RX ORDER — GABAPENTIN 400 MG/1
QD CAPSULE ORAL
Status: ACTIVE | COMMUNITY
Start: 2020-01-30

## 2024-10-04 RX ORDER — DESIPRAMINE 25 MG/1
TAKE 1 TABLET BY MOUTH EVERY NIGHT FOR 30 DAYS TABLET, FILM COATED ORAL
Qty: 30 TABLET | Refills: 11 | Status: ACTIVE | COMMUNITY

## 2024-10-21 ENCOUNTER — OFFICE VISIT (OUTPATIENT)
Dept: URBAN - METROPOLITAN AREA CLINIC 29 | Facility: CLINIC | Age: 64
End: 2024-10-21
Payer: COMMERCIAL

## 2024-10-21 VITALS
WEIGHT: 178 LBS | HEART RATE: 102 BPM | DIASTOLIC BLOOD PRESSURE: 80 MMHG | BODY MASS INDEX: 31.54 KG/M2 | SYSTOLIC BLOOD PRESSURE: 120 MMHG | HEIGHT: 63 IN

## 2024-10-21 DIAGNOSIS — K64.1 GRADE II HEMORRHOIDS: ICD-10-CM

## 2024-10-21 PROCEDURE — 46221 LIGATION OF HEMORRHOID(S): CPT | Performed by: INTERNAL MEDICINE

## 2024-10-21 RX ORDER — SODIUM SULFATE, POTASSIUM SULFATE, MAGNESIUM SULFATE 17.5; 3.13; 1.6 G/ML; G/ML; G/ML
MIX AND USE AS DIRECTED SOLUTION, CONCENTRATE ORAL
Status: ACTIVE | COMMUNITY
Start: 2020-01-30

## 2024-10-21 RX ORDER — DESIPRAMINE 25 MG/1
TAKE 1 TABLET BY MOUTH EVERY NIGHT FOR 30 DAYS TABLET, FILM COATED ORAL
Qty: 30 TABLET | Refills: 11 | Status: ACTIVE | COMMUNITY

## 2024-10-21 RX ORDER — OMEPRAZOLE 40 MG/1
QD CAPSULE, DELAYED RELEASE ORAL
Status: ACTIVE | COMMUNITY
Start: 2020-01-30

## 2024-10-21 RX ORDER — CYCLOBENZAPRINE HYDROCHLORIDE 10 MG/1
QD TABLET, FILM COATED ORAL
Status: ACTIVE | COMMUNITY
Start: 2020-01-30

## 2024-10-21 RX ORDER — DULOXETINE 60 MG/1
QD CAPSULE, DELAYED RELEASE PELLETS ORAL
Status: ACTIVE | COMMUNITY
Start: 2020-01-30

## 2024-10-21 RX ORDER — LISINOPRIL AND HYDROCHLOROTHIAZIDE TABLETS 10; 12.5 MG/1; MG/1
QD TABLET ORAL
Status: ACTIVE | COMMUNITY
Start: 2020-01-30

## 2024-10-21 RX ORDER — GABAPENTIN 400 MG/1
QD CAPSULE ORAL
Status: ACTIVE | COMMUNITY
Start: 2020-01-30

## 2024-10-21 RX ORDER — ESTRADIOL 1 MG/1
QD TABLET ORAL
Status: ACTIVE | COMMUNITY
Start: 2020-01-30

## 2024-10-21 RX ORDER — HYDROCODONE BITARTRATE AND ACETAMINOPHEN 10; 300 MG/15ML; MG/15ML
SYRUP ORAL
Status: ACTIVE | COMMUNITY
Start: 2020-01-30

## 2024-10-21 RX ORDER — LEVOTHYROXINE SODIUM 50 UG/1
QD TABLET ORAL
Status: ACTIVE | COMMUNITY
Start: 2020-01-30

## 2024-10-21 RX ORDER — PLECANATIDE 3 MG/1
1 TABLET TABLET ORAL ONCE A DAY
Qty: 30 | Refills: 3 | Status: ACTIVE | COMMUNITY
Start: 2024-08-06 | End: 2024-12-03

## 2024-10-21 RX ORDER — ONDANSETRON HYDROCHLORIDE 4 MG/1
1 TABLET TABLET, FILM COATED ORAL
Qty: 2 | Refills: 0 | Status: ACTIVE | COMMUNITY
Start: 2024-08-06

## 2024-10-21 RX ORDER — SEMAGLUTIDE 1.34 MG/ML
INJECT ONE PREFILLED PEN UNDER THE SKIN ONCE A WEEK ON THE SAME DAY EACH WEEK INJECTION, SOLUTION SUBCUTANEOUS
Qty: 1.5 MILLILITER | Refills: 0 | Status: ACTIVE | COMMUNITY

## 2024-10-21 RX ORDER — LINACLOTIDE 145 UG/1
TAKE 1 CAPSULE PO QD CAPSULE, GELATIN COATED ORAL
Status: ACTIVE | COMMUNITY
Start: 2020-06-03

## 2024-11-04 ENCOUNTER — OFFICE VISIT (OUTPATIENT)
Dept: URBAN - METROPOLITAN AREA CLINIC 29 | Facility: CLINIC | Age: 64
End: 2024-11-04
Payer: COMMERCIAL

## 2024-11-04 VITALS
HEIGHT: 63 IN | DIASTOLIC BLOOD PRESSURE: 79 MMHG | SYSTOLIC BLOOD PRESSURE: 129 MMHG | BODY MASS INDEX: 31.71 KG/M2 | WEIGHT: 179 LBS | HEART RATE: 97 BPM

## 2024-11-04 DIAGNOSIS — K64.1 GRADE II HEMORRHOIDS: ICD-10-CM

## 2024-11-04 PROCEDURE — 46221 LIGATION OF HEMORRHOID(S): CPT | Performed by: INTERNAL MEDICINE

## 2024-11-04 RX ORDER — DULOXETINE 60 MG/1
QD CAPSULE, DELAYED RELEASE PELLETS ORAL
Status: ACTIVE | COMMUNITY
Start: 2020-01-30

## 2024-11-04 RX ORDER — HYDROCODONE BITARTRATE AND ACETAMINOPHEN 10; 300 MG/15ML; MG/15ML
SYRUP ORAL
Status: ACTIVE | COMMUNITY
Start: 2020-01-30

## 2024-11-04 RX ORDER — CYCLOBENZAPRINE HYDROCHLORIDE 10 MG/1
QD TABLET, FILM COATED ORAL
Status: ACTIVE | COMMUNITY
Start: 2020-01-30

## 2024-11-04 RX ORDER — PLECANATIDE 3 MG/1
1 TABLET TABLET ORAL ONCE A DAY
Qty: 30 | Refills: 3 | Status: ACTIVE | COMMUNITY
Start: 2024-08-06 | End: 2024-12-03

## 2024-11-04 RX ORDER — SEMAGLUTIDE 1.34 MG/ML
INJECT ONE PREFILLED PEN UNDER THE SKIN ONCE A WEEK ON THE SAME DAY EACH WEEK INJECTION, SOLUTION SUBCUTANEOUS
Qty: 1.5 MILLILITER | Refills: 0 | Status: ACTIVE | COMMUNITY

## 2024-11-04 RX ORDER — GABAPENTIN 400 MG/1
QD CAPSULE ORAL
Status: ACTIVE | COMMUNITY
Start: 2020-01-30

## 2024-11-04 RX ORDER — LISINOPRIL AND HYDROCHLOROTHIAZIDE TABLETS 10; 12.5 MG/1; MG/1
QD TABLET ORAL
Status: ACTIVE | COMMUNITY
Start: 2020-01-30

## 2024-11-04 RX ORDER — ONDANSETRON HYDROCHLORIDE 4 MG/1
1 TABLET TABLET, FILM COATED ORAL
Qty: 2 | Refills: 0 | Status: ACTIVE | COMMUNITY
Start: 2024-08-06

## 2024-11-04 RX ORDER — SODIUM SULFATE, POTASSIUM SULFATE, MAGNESIUM SULFATE 17.5; 3.13; 1.6 G/ML; G/ML; G/ML
MIX AND USE AS DIRECTED SOLUTION, CONCENTRATE ORAL
Status: ACTIVE | COMMUNITY
Start: 2020-01-30

## 2024-11-04 RX ORDER — ESTRADIOL 1 MG/1
QD TABLET ORAL
Status: ACTIVE | COMMUNITY
Start: 2020-01-30

## 2024-11-04 RX ORDER — DESIPRAMINE 25 MG/1
TAKE 1 TABLET BY MOUTH EVERY NIGHT FOR 30 DAYS TABLET, FILM COATED ORAL
Qty: 30 TABLET | Refills: 11 | Status: ACTIVE | COMMUNITY

## 2024-11-04 RX ORDER — OMEPRAZOLE 40 MG/1
QD CAPSULE, DELAYED RELEASE ORAL
Status: ACTIVE | COMMUNITY
Start: 2020-01-30

## 2024-11-04 RX ORDER — LEVOTHYROXINE SODIUM 50 UG/1
QD TABLET ORAL
Status: ACTIVE | COMMUNITY
Start: 2020-01-30

## 2024-11-04 RX ORDER — LINACLOTIDE 145 UG/1
TAKE 1 CAPSULE PO QD CAPSULE, GELATIN COATED ORAL
Status: ACTIVE | COMMUNITY
Start: 2020-06-03

## 2024-11-27 ENCOUNTER — ERX REFILL RESPONSE (OUTPATIENT)
Dept: URBAN - METROPOLITAN AREA CLINIC 27 | Facility: CLINIC | Age: 64
End: 2024-11-27

## 2024-11-27 RX ORDER — PLECANATIDE 3 MG/1
TAKE ONE TABLET BY MOUTH DAILY TABLET ORAL
Qty: 30 TABLET | Refills: 11 | OUTPATIENT

## 2024-11-27 RX ORDER — PLECANATIDE 3 MG/1
1 TABLET TABLET ORAL ONCE A DAY
Qty: 30 | Refills: 3 | OUTPATIENT

## 2024-12-10 ENCOUNTER — OFFICE VISIT (OUTPATIENT)
Dept: URBAN - METROPOLITAN AREA CLINIC 29 | Facility: CLINIC | Age: 64
End: 2024-12-10

## 2024-12-20 ENCOUNTER — TELEPHONE ENCOUNTER (OUTPATIENT)
Dept: URBAN - METROPOLITAN AREA CLINIC 27 | Facility: CLINIC | Age: 64
End: 2024-12-20

## 2024-12-20 RX ORDER — DESIPRAMINE 25 MG/1
TAKE 1 TABLET BY MOUTH EVERY NIGHT FOR 30 DAYS TABLET, FILM COATED ORAL
Qty: 30 TABLET | Refills: 11

## 2025-05-21 ENCOUNTER — OFFICE VISIT (OUTPATIENT)
Dept: URBAN - METROPOLITAN AREA CLINIC 29 | Facility: CLINIC | Age: 65
End: 2025-05-21

## 2025-06-10 ENCOUNTER — OFFICE VISIT (OUTPATIENT)
Dept: URBAN - METROPOLITAN AREA CLINIC 29 | Facility: CLINIC | Age: 65
End: 2025-06-10

## 2025-06-10 RX ORDER — SEMAGLUTIDE 1.34 MG/ML
INJECT ONE PREFILLED PEN UNDER THE SKIN ONCE A WEEK ON THE SAME DAY EACH WEEK INJECTION, SOLUTION SUBCUTANEOUS
Qty: 1.5 MILLILITER | Refills: 0 | Status: ACTIVE | COMMUNITY

## 2025-06-10 RX ORDER — SODIUM SULFATE, POTASSIUM SULFATE, MAGNESIUM SULFATE 17.5; 3.13; 1.6 G/ML; G/ML; G/ML
MIX AND USE AS DIRECTED SOLUTION, CONCENTRATE ORAL
Status: ACTIVE | COMMUNITY
Start: 2020-01-30

## 2025-06-10 RX ORDER — HYDROCODONE BITARTRATE AND ACETAMINOPHEN 10; 300 MG/15ML; MG/15ML
SYRUP ORAL
Status: ACTIVE | COMMUNITY
Start: 2020-01-30

## 2025-06-10 RX ORDER — LISINOPRIL AND HYDROCHLOROTHIAZIDE TABLETS 10; 12.5 MG/1; MG/1
QD TABLET ORAL
Status: ACTIVE | COMMUNITY
Start: 2020-01-30

## 2025-06-10 RX ORDER — GABAPENTIN 400 MG/1
QD CAPSULE ORAL
Status: ACTIVE | COMMUNITY
Start: 2020-01-30

## 2025-06-10 RX ORDER — ESTRADIOL 1 MG/1
QD TABLET ORAL
Status: ACTIVE | COMMUNITY
Start: 2020-01-30

## 2025-06-10 RX ORDER — CYCLOBENZAPRINE HYDROCHLORIDE 10 MG/1
QD TABLET, FILM COATED ORAL
Status: ACTIVE | COMMUNITY
Start: 2020-01-30

## 2025-06-10 RX ORDER — DESIPRAMINE 25 MG/1
TAKE 1 TABLET BY MOUTH EVERY NIGHT FOR 30 DAYS TABLET, FILM COATED ORAL
Qty: 30 TABLET | Refills: 11 | Status: ACTIVE | COMMUNITY

## 2025-06-10 RX ORDER — ONDANSETRON HYDROCHLORIDE 4 MG/1
1 TABLET TABLET, FILM COATED ORAL
Qty: 2 | Refills: 0 | Status: ACTIVE | COMMUNITY
Start: 2024-08-06

## 2025-06-10 RX ORDER — PLECANATIDE 3 MG/1
TAKE ONE TABLET BY MOUTH DAILY TABLET ORAL
Qty: 30 TABLET | Refills: 11 | Status: ACTIVE | COMMUNITY

## 2025-06-10 RX ORDER — OMEPRAZOLE 40 MG/1
QD CAPSULE, DELAYED RELEASE ORAL
Status: ACTIVE | COMMUNITY
Start: 2020-01-30

## 2025-06-10 RX ORDER — DULOXETINE 60 MG/1
QD CAPSULE, DELAYED RELEASE PELLETS ORAL
Status: ACTIVE | COMMUNITY
Start: 2020-01-30

## 2025-06-10 RX ORDER — LEVOTHYROXINE SODIUM 50 UG/1
QD TABLET ORAL
Status: ACTIVE | COMMUNITY
Start: 2020-01-30

## 2025-06-10 RX ORDER — LINACLOTIDE 145 UG/1
TAKE 1 CAPSULE PO QD CAPSULE, GELATIN COATED ORAL
Status: ACTIVE | COMMUNITY
Start: 2020-06-03